# Patient Record
Sex: FEMALE | Race: WHITE | NOT HISPANIC OR LATINO | Employment: UNEMPLOYED | URBAN - METROPOLITAN AREA
[De-identification: names, ages, dates, MRNs, and addresses within clinical notes are randomized per-mention and may not be internally consistent; named-entity substitution may affect disease eponyms.]

---
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
## 2025-05-05 ENCOUNTER — APPOINTMENT (OUTPATIENT)
Dept: PRIMARY CARE | Facility: CLINIC | Age: 20
End: 2025-05-05
Payer: COMMERCIAL

## 2025-06-05 ENCOUNTER — APPOINTMENT (OUTPATIENT)
Dept: PRIMARY CARE | Facility: CLINIC | Age: 20
End: 2025-06-05
Payer: COMMERCIAL

## 2025-06-05 VITALS
BODY MASS INDEX: 20.36 KG/M2 | WEIGHT: 130 LBS | OXYGEN SATURATION: 99 % | SYSTOLIC BLOOD PRESSURE: 107 MMHG | DIASTOLIC BLOOD PRESSURE: 71 MMHG | HEART RATE: 72 BPM

## 2025-06-05 DIAGNOSIS — Z76.89 ENCOUNTER TO ESTABLISH CARE WITH NEW PROVIDER: Primary | ICD-10-CM

## 2025-06-05 DIAGNOSIS — G47.19 EXCESSIVE DAYTIME SLEEPINESS: ICD-10-CM

## 2025-06-05 DIAGNOSIS — Z00.00 ANNUAL PHYSICAL EXAM: ICD-10-CM

## 2025-06-05 DIAGNOSIS — Z13.0 SCREENING FOR DEFICIENCY ANEMIA: ICD-10-CM

## 2025-06-05 PROCEDURE — 1036F TOBACCO NON-USER: CPT | Performed by: NURSE PRACTITIONER

## 2025-06-05 PROCEDURE — 99385 PREV VISIT NEW AGE 18-39: CPT | Performed by: NURSE PRACTITIONER

## 2025-06-05 RX ORDER — ESCITALOPRAM OXALATE 5 MG/1
7.5 TABLET ORAL DAILY
COMMUNITY

## 2025-06-05 ASSESSMENT — ENCOUNTER SYMPTOMS
RESPIRATORY NEGATIVE: 1
HEMATOLOGIC/LYMPHATIC NEGATIVE: 1
CONSTITUTIONAL NEGATIVE: 1
MUSCULOSKELETAL NEGATIVE: 1
ALLERGIC/IMMUNOLOGIC NEGATIVE: 1
FACIAL ASYMMETRY: 0
DEPRESSION: 1
POLYPHAGIA: 0
WEAKNESS: 0
EYES NEGATIVE: 1
ENDOCRINE NEGATIVE: 1
GASTROINTESTINAL NEGATIVE: 1
DIZZINESS: 0
LIGHT-HEADEDNESS: 0
CARDIOVASCULAR NEGATIVE: 1
NERVOUS/ANXIOUS: 1
NEUROLOGICAL NEGATIVE: 1
WOUND: 0
SLEEP DISTURBANCE: 1
CONFUSION: 0

## 2025-06-05 ASSESSMENT — PATIENT HEALTH QUESTIONNAIRE - PHQ9
1. LITTLE INTEREST OR PLEASURE IN DOING THINGS: NOT AT ALL
1. LITTLE INTEREST OR PLEASURE IN DOING THINGS: MORE THAN HALF THE DAYS
2. FEELING DOWN, DEPRESSED OR HOPELESS: NOT AT ALL
SUM OF ALL RESPONSES TO PHQ9 QUESTIONS 1 AND 2: 2
SUM OF ALL RESPONSES TO PHQ9 QUESTIONS 1 AND 2: 0
2. FEELING DOWN, DEPRESSED OR HOPELESS: NOT AT ALL

## 2025-06-05 NOTE — PROGRESS NOTES
Subjective   Patient ID: Patricia Hennessy is a 20 y.o. female who presents for New Patient Visit (New provider ).    HPI   Her to [facility] care. Does have excessive daytime sleepiness   Was been following  the sleep medicine department at [facility]:   Thats she is falling asleep in class several times a week;   She sees her psychologist regularly   On Topamax for he migraines;   We discussed side effects of these meds cause lead to drowsiness and daytime fatigue;   She will talk to her neurologist in regards to other migraine options  As her fatigue started getting worse with the adjustment of meds.       PMH: migraine. Depression/anxiety , sleep disorder with excessive daytime sleepiness     Past family hx:  Dad: lymphoma   Aunt: breast Cancer   Mom: thyroid disorder   Sister; thyroid disorder   Review of Systems   Constitutional: Negative.    HENT: Negative.     Eyes: Negative.    Respiratory: Negative.     Cardiovascular: Negative.    Gastrointestinal: Negative.    Endocrine: Negative.  Negative for polyphagia.   Genitourinary: Negative.    Musculoskeletal: Negative.    Skin: Negative.  Negative for pallor, rash and wound.   Allergic/Immunologic: Negative.    Neurological: Negative.  Negative for dizziness, facial asymmetry, weakness and light-headedness.   Hematological: Negative.    Psychiatric/Behavioral:  Positive for sleep disturbance. Negative for confusion and suicidal ideas. The patient is nervous/anxious.    All other systems reviewed and are negative.      Objective   /71 (BP Location: Right arm, Patient Position: Sitting, BP Cuff Size: Adult)   Pulse 72   Wt 59 kg (130 lb)   SpO2 99%   BMI 20.36 kg/m²     Physical Exam  Vitals and nursing note reviewed.   Constitutional:       Appearance: Normal appearance. She is normal weight.   HENT:      Head: Normocephalic.      Nose: Nose normal.      Mouth/Throat:      Mouth: Mucous membranes are moist.   Eyes:      Extraocular Movements: Extraocular movements  intact.      Conjunctiva/sclera: Conjunctivae normal.      Pupils: Pupils are equal, round, and reactive to light.   Cardiovascular:      Rate and Rhythm: Normal rate and regular rhythm.      Pulses: Normal pulses.      Heart sounds: Normal heart sounds.   Pulmonary:      Effort: Pulmonary effort is normal.   Abdominal:      General: Abdomen is flat. Bowel sounds are normal.      Palpations: Abdomen is soft.   Musculoskeletal:         General: Normal range of motion.      Cervical back: Normal range of motion.   Skin:     General: Skin is warm and dry.   Neurological:      General: No focal deficit present.      Mental Status: She is alert and oriented to person, place, and time.   Psychiatric:         Mood and Affect: Mood normal.         Behavior: Behavior normal.         Assessment/Plan   1. Encounter to establish care with new provider (Primary)    2. Annual physical exam  - Hemoglobin A1C; Future  - CBC; Future  - Comprehensive Metabolic Panel; Future  - TSH with reflex to Free T4 if abnormal; Future  - Triiodothyronine, Total; Future  - Hemoglobin A1C  - CBC  - Comprehensive Metabolic Panel  - TSH with reflex to Free T4 if abnormal  - Triiodothyronine, Total    3. Screening for deficiency anemia  - Iron and TIBC; Future  - Ferritin; Future  - Folate; Future  - Iron and TIBC  - Ferritin  - Folate    4. Excessive daytime sleepiness  - Referral to Adult Sleep Medicine; Future       Yearly exam or sooner if problems arise

## 2025-06-06 LAB
ALBUMIN SERPL-MCNC: 4.2 G/DL (ref 3.6–5.1)
ALP SERPL-CCNC: 52 U/L (ref 31–125)
ALT SERPL-CCNC: 12 U/L (ref 6–29)
ANION GAP SERPL CALCULATED.4IONS-SCNC: 8 MMOL/L (CALC) (ref 7–17)
AST SERPL-CCNC: 18 U/L (ref 10–30)
BILIRUB SERPL-MCNC: 0.4 MG/DL (ref 0.2–1.2)
BUN SERPL-MCNC: 12 MG/DL (ref 7–25)
CALCIUM SERPL-MCNC: 9.1 MG/DL (ref 8.6–10.2)
CHLORIDE SERPL-SCNC: 107 MMOL/L (ref 98–110)
CO2 SERPL-SCNC: 23 MMOL/L (ref 20–32)
CREAT SERPL-MCNC: 0.91 MG/DL (ref 0.5–0.96)
EGFRCR SERPLBLD CKD-EPI 2021: 93 ML/MIN/1.73M2
ERYTHROCYTE [DISTWIDTH] IN BLOOD BY AUTOMATED COUNT: 13.7 % (ref 11–15)
EST. AVERAGE GLUCOSE BLD GHB EST-MCNC: 97 MG/DL
EST. AVERAGE GLUCOSE BLD GHB EST-SCNC: 5.4 MMOL/L
FERRITIN SERPL-MCNC: 28 NG/ML (ref 16–154)
FOLATE SERPL-MCNC: 10 NG/ML
GLUCOSE SERPL-MCNC: 98 MG/DL (ref 65–99)
HBA1C MFR BLD: 5 %
HCT VFR BLD AUTO: 44 % (ref 35–45)
HGB BLD-MCNC: 14.3 G/DL (ref 11.7–15.5)
IRON SATN MFR SERPL: 48 % (CALC) (ref 16–45)
IRON SERPL-MCNC: 165 MCG/DL (ref 40–190)
MCH RBC QN AUTO: 32.8 PG (ref 27–33)
MCHC RBC AUTO-ENTMCNC: 32.5 G/DL (ref 32–36)
MCV RBC AUTO: 100.9 FL (ref 80–100)
PLATELET # BLD AUTO: 179 THOUSAND/UL (ref 140–400)
PMV BLD REES-ECKER: 9.5 FL (ref 7.5–12.5)
POTASSIUM SERPL-SCNC: 4.2 MMOL/L (ref 3.5–5.3)
PROT SERPL-MCNC: 7.1 G/DL (ref 6.1–8.1)
RBC # BLD AUTO: 4.36 MILLION/UL (ref 3.8–5.1)
SODIUM SERPL-SCNC: 138 MMOL/L (ref 135–146)
T3 SERPL-MCNC: 174 NG/DL (ref 86–192)
TIBC SERPL-MCNC: 341 MCG/DL (CALC) (ref 250–450)
TSH SERPL-ACNC: 1.18 MIU/L
WBC # BLD AUTO: 5 THOUSAND/UL (ref 3.8–10.8)

## 2025-06-17 ENCOUNTER — OFFICE VISIT (OUTPATIENT)
Dept: SLEEP MEDICINE | Facility: CLINIC | Age: 20
End: 2025-06-17
Payer: COMMERCIAL

## 2025-06-17 VITALS
OXYGEN SATURATION: 98 % | HEIGHT: 67 IN | DIASTOLIC BLOOD PRESSURE: 76 MMHG | HEART RATE: 77 BPM | SYSTOLIC BLOOD PRESSURE: 120 MMHG | WEIGHT: 124 LBS | BODY MASS INDEX: 19.46 KG/M2

## 2025-06-17 DIAGNOSIS — G47.61 PERIODIC LIMB MOVEMENTS OF SLEEP: Primary | ICD-10-CM

## 2025-06-17 DIAGNOSIS — G47.19 EXCESSIVE DAYTIME SLEEPINESS: ICD-10-CM

## 2025-06-17 PROCEDURE — 1036F TOBACCO NON-USER: CPT | Performed by: NURSE PRACTITIONER

## 2025-06-17 PROCEDURE — 99214 OFFICE O/P EST MOD 30 MIN: CPT | Performed by: NURSE PRACTITIONER

## 2025-06-17 PROCEDURE — 99204 OFFICE O/P NEW MOD 45 MIN: CPT | Performed by: NURSE PRACTITIONER

## 2025-06-17 PROCEDURE — 3008F BODY MASS INDEX DOCD: CPT | Performed by: NURSE PRACTITIONER

## 2025-06-17 ASSESSMENT — SLEEP AND FATIGUE QUESTIONNAIRES
ESS-CHAD TOTAL SCORE: 16
HOW LIKELY ARE YOU TO NOD OFF OR FALL ASLEEP WHILE WATCHING TV: HIGH CHANCE OF DOZING
HOW LIKELY ARE YOU TO NOD OFF OR FALL ASLEEP WHILE SITTING AND READING: MODERATE CHANCE OF DOZING
SITING INACTIVE IN A PUBLIC PLACE LIKE A CLASS ROOM OR A MOVIE THEATER: HIGH CHANCE OF DOZING
SLEEP_PROBLEM_INTERFERES_DAILY_ACTIVITIES: VERY MUCH NOTICEABLE
HOW LIKELY ARE YOU TO NOD OFF OR FALL ASLEEP WHILE SITTING AND TALKING TO SOMEONE: SLIGHT CHANCE OF DOZING
HOW LIKELY ARE YOU TO NOD OFF OR FALL ASLEEP WHILE LYING DOWN TO REST IN THE AFTERNOON WHEN CIRCUMSTANCES PERMIT: HIGH CHANCE OF DOZING
SATISFACTION_WITH_CURRENT_SLEEP_PATTERN: VERY DISSATISFIED
SLEEP_PROBLEM_NOTICEABLE_TO_OTHERS: VERY MUCH NOTICEABLE
WORRIED_DISTRESSED_DUE_TO_SLEEP: VERY MUCH NOTICEABLE
HOW LIKELY ARE YOU TO NOD OFF OR FALL ASLEEP IN A CAR, WHILE STOPPED FOR A FEW MINUTES IN TRAFFIC: SLIGHT CHANCE OF DOZING
HOW LIKELY ARE YOU TO NOD OFF OR FALL ASLEEP WHEN YOU ARE A PASSENGER IN A CAR FOR AN HOUR WITHOUT A BREAK: HIGH CHANCE OF DOZING
DIFFICULTY_STAYING_ASLEEP: MODERATE
HOW LIKELY ARE YOU TO NOD OFF OR FALL ASLEEP WHILE SITTING QUIETLY AFTER LUNCH WITHOUT ALCOHOL: WOULD NEVER DOZE
DIFFICULTY_FALLING_ASLEEP: VERY SEVERE

## 2025-06-17 NOTE — ASSESSMENT & PLAN NOTE
Sleep disordered breathing ruled out on recent HSAT and PSG  Denies REM intrusive symptoms. Endorses high sleep inertia, brain fog, migraines, daytime sleepiness.   Reports variable sleep schedule week day to weekend. Advised to complete sleep logs and return to me in 2-4 weeks. Several sheets provided with instructions on how to complete. She verbalized understanding  Advised to remove electronics from the bedroom, implement wind down routine.   She was recommended CBT-I per [facility]. I agree this would likely be beneficial with complaints re: sleep onset  Unclear if daytime sleepiness related to disorder of hypersomnia, insufficient sleep syndrome, or delayed sleep phase. Will reevaluate after sleep logs completed.    Advised seeing me and [facility] for care would be redundant- has follow up scheduled with Chelsi Samuels in Dec. Advised to schedule CBT-I with [facility], however as we currently do not have an active provider. She was agreeable.

## 2025-06-17 NOTE — PROGRESS NOTES
Patient: Patricia Hennessy    [id number]  : 2005 -- AGE 20 y.o.    Provider: ISIAH Miles-CNP     Location [facility]   Service Date: 2025              [facility]  New Visit Note      HISTORY OF PRESENT ILLNESS     The patient's referring provider is: Alejandrina Novak APR*    HISTORY OF PRESENT ILLNESS   Patricia Hennessy is a 20 y.o. female who presents to a [facility] for a sleep medicine evaluation with concerns of daytime sleepiness.     The patient has h/o  has a past medical history of Allergic rhinitis..migraines, anxiety    Past Sleep History  Patient has had a home sleep study dated 25 showing no significant sleep disordered breathing  In lab sleep study was completed in May 2025 showing no significant sleep disordered breathing with an AHI of 1.4 and SpO2 nona of 95%. The total sleep time was 354 minutes with sleep latency of 66.5 minutes. Supine sleep was observed 100% of sleep time. PLMs were noted with an index of 14.6 and an arousal index of 0.8.   She saw Chelsi Samuels with recommendation for CBT-I and iron supplementation.     Current History    On today's visit, the patient reports difficulty falling asleep at times. Notes it sometimes takes an hour to doze off at night. Notes she watches shows on her computer or YouTube on her phone. Notes she tends to ruminate if she does not. Sees therapist and psychiatrist. Recently started on lexapro. Taking at bedtime. Does not seem to have helped with sleep. Notes she has felt sleepy through high school. Remembers dozing off easily in class in high school now tends to when she is in class at times. Sometimes dozes off as she is writing notes. 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 Often tries to go to sleep earlier on work days/ week days, stays up later on weekends and sleeps in later. Notes she often lacks  motivation. She states she often will nap after internship, spend time in her bed. She often naps on weekends as well. Sometimes 2-3 hours. She does not feel rested after naps. Denies REM intrusive symptoms. Notes she has family history of hypo and hyperthyrodism. Had lab work done recently. Was told to start iron replacement per Chelsi Samuels, but stopped as PCP told her she did not need to. Was tolerating replacement fine. Did not seem to change her sleep. Notes she often has headaches when she wakes in the morning. Denies head trauma, previous viral illness correlating with symptom onset.  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     Sleep schedule  on weekdays / work days:  Usual Bedtime:    10 PM  Falls asleep around:  11 PM  # of awakenings:   Wake time:  8 AM    Naps: 2-4 PM for 2 hours     Sleep schedule on weekends/non work days :  Usual Bedtime:    midnight  Falls asleep around:    # of awakenings:   Wake time:  10 AM    Naps: 2-5 PM for 2-3 hours     Preferred sleeping position: back, side     Sleep-related ROS:    Problems going to sleep: rumination/thoughts/worries    Problems staying asleep: denies     Breathing during sleep: snoring    Daytime Symptoms  On awakening patient reports: wake unrefreshed, feels sleepy, and hard to get out of bed    RLS screen:  RLSSCREEN: - Sensations: Patient has unusual sensations in their extremities that cause an urge to move them   - Frequency: frequently   - Relief: Symptoms ARE/ARENOT: are relieved with movement   - Circadian: Symptoms ARE/ARENOT: are not typically improved later in the night.   - Movement: Patient has not been told that their legs kick or jerk during sleep    Sleep-related behaviors:   dreams upon falling asleep in the early morning    Fatigue: late to work, irritability, memory or concentration difficulties, was recommended ADHD testing, sleepy while driving at times     ESS: 16   ELIEZER: 22  FOSQ:  20      REVIEW OF  "SYSTEMS     REVIEW OF SYSTEMS  Review of Systems   All other systems reviewed and are negative.        ALLERGIES AND MEDICATIONS     ALLERGIES  Allergies[1]    MEDICATIONS  Current Medications[2]      PAST HISTORY     PAST MEDICAL HISTORY  Medical History[3]    PAST SURGICAL HISTORY:  Surgical History[4]    FAMILY HISTORY  Family History[5]    SOCIAL HISTORY  She  reports that she has never smoked. She has never been exposed to tobacco smoke. She has never used smokeless tobacco. She reports that she does not drink alcohol and does not use drugs. She currently lives parents.     Caffeine consumption: 1x day  Alcohol consumption: 1x month  Smoking: none  Marijuana: none    PHYSICAL EXAM     VITAL SIGNS: /76 (BP Location: Left arm, Patient Position: Sitting)   Pulse 77   Ht 1.702 m (5' 7\")   Wt 56.2 kg (124 lb)   SpO2 98%   BMI 19.42 kg/m²      PREVIOUS WEIGHTS:  Wt Readings from Last 3 Encounters:   06/17/25 56.2 kg (124 lb)   06/05/25 59 kg (130 lb)   08/14/24 58.2 kg (128 lb 6.4 oz) (53%, Z= 0.06)*     * Growth percentiles are based on CDC (Girls, 2-20 Years) data.       Physical Exam  Physical Exam   Constitutional: Alert and oriented, cooperative, no obvious distress   HEENT: Non icteric or anemic, EOM WNL bilaterally   Neck: Supple, no JVD, no goiter, no adenopathy, no rigidity  Chest: CTA bilaterally, no wheezing, crackles, rubs   Cardiac: RRR, S1 and S2, no murmur, rub, thrill   Abdomen: Soft, nontender, no masses, no organomegaly   Extremities: No clubbing, no LL edema   Neuromuscular: Cranial nerves grossly intact, no focal deficits        RESULTS/DATA     CARBON DIOXIDE (mmol/L)   Date Value   06/05/2025 23     Bicarbonate (mmol/L)   Date Value   08/16/2024 24     IRON, TOTAL (mcg/dL)   Date Value   06/05/2025 165     Iron (ug/dL)   Date Value   08/16/2024 145     % SATURATION (% (calc))   Date Value   06/05/2025 48 (H)     % Saturation (%)   Date Value   08/16/2024 37     IRON BINDING CAPACITY " "(mcg/dL (calc))   Date Value   06/05/2025 341     TIBC (ug/dL)   Date Value   08/16/2024 397     FERRITIN (ng/mL)   Date Value   06/05/2025 28     Ferritin (ng/mL)   Date Value   08/16/2024 47       Pulmonary Functions Testing Results:    No results found for: \"FEV1\", \"FVC\", \"NNC1UVG\", \"TLC\", \"DLCO\"    Echo:      Failed to redirect to the Timeline version of the [employer] SmartLink.      PAP Adherence:      ASSESSMENT/PLAN     Ms. Hennessy is a 20 y.o. female and She was referred to the [facility] for evaluation of daytime sleepiness      Problem List and Orders  Problem List Items Addressed This Visit           ICD-10-CM    Excessive daytime sleepiness G47.19    Sleep disordered breathing ruled out on recent HSAT and PSG  Denies REM intrusive symptoms. Endorses high sleep inertia, brain fog, migraines, daytime sleepiness.   Reports variable sleep schedule week day to weekend. Advised to complete sleep logs and return to me in 2-4 weeks. Several sheets provided with instructions on how to complete. She verbalized understanding  Advised to remove electronics from the bedroom, implement wind down routine.   She was recommended CBT-I per [facility]. I agree this would likely be beneficial with complaints re: sleep onset  Unclear if daytime sleepiness related to disorder of hypersomnia, insufficient sleep syndrome, or delayed sleep phase. Will reevaluate after sleep logs completed.    Advised seeing me and [facility] for care would be redundant- has follow up scheduled with Chelsi Samuels in Dec. Advised to schedule CBT-I with [facility], however as we currently do not have an active provider. She was agreeable.            Periodic limb movements of sleep - Primary G47.61    PLMs noted on in lab PSG  Ferritin and iron reviewed  Recommended resuming iron replacement to ferritin > 75. Advised she may need to take ~ 6 mo or longer to notice improvement in symptoms. She verbalized understanding  Lexapro may " exacerbate.                       [1]   Allergies  Allergen Reactions    Azithromycin Hives    Adhesive Rash    Prozac [Fluoxetine] Rash   [2]   Current Outpatient Medications   Medication Sig Dispense Refill    escitalopram (Lexapro) 5 mg tablet Take 1.5 tablets (7.5 mg) by mouth once daily.      norelgestromin-ethin.estradioL (Xulane) 150-35 mcg/24 hr Place 1 patch on the skin 1 (one) time per week.      norethindrone-e.estradioL-iron (Loestrin 24 FE) 1 mg-20 mcg (24)/75 mg (4) tablet Take 1 tablet by mouth once daily.      SUMAtriptan (Imitrex) 25 mg tablet TAKE 1 TABLET (25 MG) BY MOUTH 1 TIME IF NEEDED FOR MIGRAINE. MAY REPEAT DOSE ONCE IN 2 HOURS IF NO RELIEF. DO NOT EXCEED 2 DOSES IN 24 HOURS. 9 tablet 0    tobramycin (Tobrex) 0.3 % ophthalmic solution       topiramate (Topamax) 25 mg tablet Take 1 tablet (25 mg) by mouth 2 times a day. (Patient taking differently: Take 4 tablets (100 mg) by mouth 2 times a day.) 60 tablet 11     No current facility-administered medications for this visit.   [3]   Past Medical History:  Diagnosis Date    Allergic rhinitis    [4] History reviewed. No pertinent surgical history.  [5]   Family History  Problem Relation Name Age of Onset    Allergic rhinitis Mother Carla     Hyperthyroidism Mother Carla     Allergic rhinitis Sister      Breast cancer Mother's Sister          BRCA2 positive in her 30s    Diabetes type II Maternal Grandmother

## 2025-06-17 NOTE — PATIENT INSTRUCTIONS
"  Insomnia care:  Insomnia, or trouble falling asleep or staying asleep, can be caused by many different things such as untreated sleep apnea, anxiety, depression, stress, poor sleep habits and other medical conditions or medications. The best way to treat insomnia is to treat the cause. In general, we can all benefit from better sleep hygiene. Some recommendations to help you improve your sleep hygiene so you can fall asleep, stay asleep, and wake up felling refreshed include:    Keep a routine bedtime and rise time.  Avoid caffeine in the late afternoon and early evening, including chocolate.   Keep your bedroom technology free. Avoid TV and electronics one hour prior to bedtime. Don't have a clock visible in your room.  Set up a 'worry\" time in the late afternoon to cope with her worries and plan for the following day.  Avoid naps. If necessary, keep naps to under 15-20 minutes.  Develop a relaxing routine before bed.  Keep the bedroom for sleeping and intimacy only.  Make your bedroom dark, quiet, and comfortable temperature.  Do not exercise right before bed. Do get 20-30 minutes of exercise during your day.   Do not stay in bed for more than 30 minutes if you cannot sleep. Leave your bedroom and find a dull activity to do until you feel you could sleep. Then return to your bed.   Don't sleep with your pet.   A light bedtime snack such as a glass of milk and banana can promote sleep.    You have been recommended to Cognitive Behavioral Therapy for Insomnia (CBT-I). CBT-I is widely recognized as an effective treatment for a wide range of insomnias. The treatment is typically made up of a number of components including assessment, behavioral and cognitive interventions, motivational techniques and relapse prevention skills. An overwhelming amount of evidence suggests that CBT-I is as effective as hypnotics and the newer non-benzodiazepine sleep aides in the acute treatment and is more effective than medication in " the long term treatment of insomnia.     CBT-I at  - no current services  Jo- online course via [facility]. Self-guided. One time charge to sign up: Jo  SleepEZ- Free self-guided course from the [facility] https://www.veterantraining.va.gov/insomnia/  Dr. Mata - one on one CBT-I service [email or web address]  CBT-I at [facility]  -[phone] Dr. Aliza Hubbard, Dr. Jumana Ng, Dr. Carine Banuelos  [email or web address]- directory search for [place] providers or telemedicine providers for CBT-i    Restart iron supplement-- ferritin should be above 75 for [facility] Sleep Medicine  [facility] [place] [address] [place] [address] [place] [address]       NAME: Patricia Hennessy   DATE:  06/17/25    DIAGNOSIS:   1. Excessive daytime sleepiness  Referral to Adult Sleep Medicine          Thank you for coming to the Sleep Medicine Clinic today! Your sleep medicine provider today was: ISIAH Miles-CNP Below is a summary of your treatment plan, other important information, and our contact numbers:    TREATMENT PLAN:   - Follow-up in 6 months.  - If not already done, sign up for 'My Chart' and send prescription requests or messages through this    Instructions - Common JAKOB Recs: - For your sleep apnea, continue to use your PAP every night and use it whenever you are sleeping.   - Avoid alcohol or sedatives several hours prior to sleeping.   - Get additional supplies for your PAP (e.g., mask, hose, filters) every 3 months or as your insurance allows from your [employer] company. Replacement cushions for your PAP mask can be requested monthly if airseals are an issue.  - Remember to clean your mask, tubings, and water chamber regularly as instructed.  - Avoid driving or operating heavy machinery when drowsy. A person driving while sleepy is five (5) times more likely to have an accident. If you feel sleepy, pull over and take a short power nap (sleep  for less than 30 minutes). Otherwise, ask somebody to drive you.    EASY WAYS TO IMPROVE YOUR SLEEP:  1. Go to bed and wake up at the same time every day.   Aim for 8 hours but some people need less, some need more.   Get out of bed if you are not sleeping.   Limit naps to 20 min or less.   2. Expose yourself to daylight and/ or bright light in the morning.   Go outside or spend time near a window each morning.   You can use a light box (found on [employer]) if you wake before the sunrise.   Limit light exposure in the evenings (including electronic usage).   Try meditation, reading, stretching, deep breathing, warm shower or bath, or yoga nidra as part of your bedtime routine. There are many great FREE, videos or audio tracks on [employer]/ [employer], etc for guidance.  3. Exercise, in some form, EVERY day, but not too close to bedtime. Consider making this part of your routine at the start of your day, followed by a cool shower.  4. Eat meals at roughly the same time every day. Make sure you are prioritizing fruits, vegetables, whole grains, lean proteins.  5. Time your caffeine intake. Make sure you are not drinking caffeine within 8 to 12 hours prior to your bedtime.   6. Avoid marijuana, alcohol, and nicotine. They will reduce sleep quality in any quantity.  7. Learn to manage anxiety. Psychology services at  can be reached at [phone] to schedule an appointment.     IMPORTANT INFORMATION:     Call [phone] for medical emergencies.  Our offices are generally open from Monday-Friday, 9 am - 5 pm.  If you need to get in touch with me, you may either call me and my team(number is below) or you can use [employer].  If a referral for a test, for CPAP, or for another specialist was made, and you have not heard about scheduling this within a week, please call scheduling at [phone] ([phone]).  If you are unable to make your appointment for clinic or an overnight study, kindly call the office at least 48 hours in advance to  cancel and reschedule.  If you are on CPAP, please bring your device's card to each clinic appointment unless told otherwise by your provider.  There are no supporting services by either the sleep doctors or their staff on weekends and Holidays, or after 5 PM on weekdays.   If you have been asked to come to a sleep study, make sure you bring toiletries, a comfy pillow, and any nighttime medications that you may regularly take. Also be sure to eat dinner before you arrive. We generally do not provide meals.      PRESCRIPTIONS:  We require 7 days advanced notice for prescription refills. If we do not receive the request in this time, we cannot guarantee that your medication will be refilled in time. Please contact the sleep nurses listed below for refills or request via [employer].     IMPORTANT PHONE NUMBERS:   Sleep Medicine Clinic Fax: [phone]  Appointments (for Pediatric Sleep Clinic): [phone] - option 1  Appointments (for Adult Sleep Clinic): [phone] - option 2  Appointments (For Sleep Studies): [phone] - option 3  Behavioral Sleep Medicine: [phone]  Sleep Surgery: [phone]  ENT (Otolaryngology): [phone]  Headache Clinic (Neurology): [phone]  Neurology: [phone]  Psychiatry: [phone]  Pulmonary Function Testing (PFT) Center: [phone]  Pulmonary Medicine: [phone]  [employer] (DME): [phone]  [employer] (DME): [phone]  [facility] ([facility]): [phone]    Our Adult Sleep Medicine Team (Please do not hesitate to call the office or sleep nurse with any questions between appointments):    Adult Sleep Nurses (Rae Mitchell RN and Alecia Stacy RN):  For clinical questions and refilling prescriptions: [phone]  Email sleep diaries and other documents at: [email or web address]    Adult Sleep Medicine Secretaries:  Marilyn Buckley (For Claire/Jessica/Leandro/Andrew/Joy): P: [phone]  F:  [phone]  Otoniel Muñoz ([place])Office: [phone] option 4 Office Fax: [phone]  Margaret Mejía (For Thakkar): P: [phone]  Fax: [phone]  Juhi Tran (For Jurcevic/Blank): P: [phone]  F: [phone]  Fior Bozena (For [place]): P: [phone]  F: [phone]  Gail Martinez (For Ashley/Pawan/Zakhary): P: [phone]  F: [phone]  Liset Barrow (For Marie/Penaloza): P: [phone]  F: [phone]     Adult Sleep Medicine Advanced Practice Providers:  Kaushik Doran ([place], [place])  Rochelle Villalta ([facility])  Bree Avina CNP ([place], [place], [place])  Shoshana Reeves CNP ([place], [place], [place])  Nancy Giraldo ([place], [place], [place])  Juan C Penaloza CNP ([facility])      Our Sleep Testing Center (STC) Locations:  Our team will contact you to schedule your sleep study, however, you can contact us as follow:  Main Phone Line (scheduling only): [phone] ([phone]), option 3  Adult and Pediatric Locations  [facility] (6 years and older): [facility] - 4th floor ([address] [place] [address]) After hours line: [phone]  [facility] (Main campus: All ages): [facility], 6th floor. After hours line: [phone]   [place] (5 years and older; younger considered on case-by-case basis): [address]; [address], [address]. Scheduling  After hours line: [phone]   [place] (6 years and older): [address]; [address]; [address]   [place] (6 years and older): [address] [facility] [address]  After hours line: [phone]   [place] (13 years and older) in [place]: [address], 2nd floor  After hours line: [phone]   [place] (13 year and older): [address] [facility] [address]  After hours line: [phone] (Home studies out of [facility])    Adult Only Locations:   [place] (18 years and older): [address] [place] [address]  "Drive, 2nd floor   [place] (18 years and older): [address] [facility]; 4th floor  After hours line: [phone]  [facility] (18 years and older) at [place]: [address] [facility]  After hours line: [phone]        CONTACTING YOUR SLEEP MEDICINE PROVIDER:  Send a message directly to your provider through \"My Chart\", which is the email service through your  Records Account: [email or web address]   Call [phone] and leave a message. One of the administrative assistants will forward the message to your sleep medicine provider through \"My Chart\" and/or email.     Your sleep medicine provider for this visit was: ISIAH Miles-CNP    In the event that you are running more than 15 minutes late to your appointment, I will kindly ask you to reschedule.       "

## 2025-06-17 NOTE — ASSESSMENT & PLAN NOTE
PLMs noted on in lab PSG  Ferritin and iron reviewed  Recommended resuming iron replacement to ferritin > 75. Advised she may need to take ~ 6 mo or longer to notice improvement in symptoms. She verbalized understanding  Lexapro may exacerbate.